# Patient Record
Sex: FEMALE | Race: WHITE
[De-identification: names, ages, dates, MRNs, and addresses within clinical notes are randomized per-mention and may not be internally consistent; named-entity substitution may affect disease eponyms.]

---

## 2021-05-12 DIAGNOSIS — Z87.39 PERSONAL HISTORY OF OTHER DISEASES OF THE MUSCULOSKELETAL SYSTEM AND CONNECTIVE TISSUE: ICD-10-CM

## 2021-05-12 DIAGNOSIS — Z78.9 OTHER SPECIFIED HEALTH STATUS: ICD-10-CM

## 2021-05-12 DIAGNOSIS — Z85.831 PERSONAL HISTORY OF MALIGNANT NEOPLASM OF SOFT TISSUE: ICD-10-CM

## 2021-05-12 PROBLEM — Z00.00 ENCOUNTER FOR PREVENTIVE HEALTH EXAMINATION: Status: ACTIVE | Noted: 2021-05-12

## 2021-05-12 RX ORDER — NAPROXEN 375 MG/1
375 TABLET ORAL
Refills: 0 | Status: ACTIVE | COMMUNITY

## 2021-05-17 ENCOUNTER — APPOINTMENT (OUTPATIENT)
Dept: OBGYN | Facility: CLINIC | Age: 71
End: 2021-05-17
Payer: MEDICARE

## 2021-05-17 VITALS
WEIGHT: 143 LBS | SYSTOLIC BLOOD PRESSURE: 122 MMHG | TEMPERATURE: 97.3 F | HEIGHT: 58 IN | BODY MASS INDEX: 30.02 KG/M2 | DIASTOLIC BLOOD PRESSURE: 80 MMHG

## 2021-05-17 PROCEDURE — 99213 OFFICE O/P EST LOW 20 MIN: CPT | Mod: 25

## 2021-05-17 PROCEDURE — 99072 ADDL SUPL MATRL&STAF TM PHE: CPT

## 2021-05-17 PROCEDURE — 76830 TRANSVAGINAL US NON-OB: CPT

## 2021-05-17 NOTE — PROCEDURE
[Transvaginal Ultrasound] : transvaginal ultrasound [FreeTextEntry3] : no changes\par all ok\par observe [FreeTextEntry5] : 24.5cc, lining; 2mm [FreeTextEntry7] : 0.3cc [FreeTextEntry8] : 0.5cc

## 2021-05-17 NOTE — HISTORY OF PRESENT ILLNESS
[FreeTextEntry1] : last carlin note:\par \par 2. Noninflammatory disorder of uterus -\par PELVIC MRI PRE AND POST CONTRAST WERE NEGATIVE\par \par calcified myoma\par \par SONO TODAY, CONFIRMATORY\par \par \par N85.9: Noninflammatory disorder of uterus, unspecified\par US, TRANSVAGINAL\par \par US, TRANSVAGINAL\par \par Review of us, transvaginal taken on 07/20/2020 at [facility] OFFICE shows:\par Imaging Studies:\par Uterus: volume (cc): 25.2 and fibroids (SM: 0.6CC).\par Endometrium: thickness 2.1 mm.\par Cervix: normal.\par Cul de sac: no fluid was demonstrated.\par Right Ovary: volume (cc): 0.4.\par Left Ovary: volume (cc): 0.3.\par \par Today, here for fu

## 2021-05-23 LAB
C TRACH RRNA SPEC QL NAA+PROBE: NOT DETECTED
HPV HIGH+LOW RISK DNA PNL CVX: NOT DETECTED
N GONORRHOEA RRNA SPEC QL NAA+PROBE: NOT DETECTED
SOURCE AMPLIFICATION: NORMAL

## 2021-05-30 LAB — CYTOLOGY CVX/VAG DOC THIN PREP: ABNORMAL

## 2022-06-02 ENCOUNTER — NON-APPOINTMENT (OUTPATIENT)
Age: 72
End: 2022-06-02

## 2022-06-06 ENCOUNTER — APPOINTMENT (OUTPATIENT)
Dept: OBGYN | Facility: CLINIC | Age: 72
End: 2022-06-06
Payer: MEDICARE

## 2022-06-06 VITALS
DIASTOLIC BLOOD PRESSURE: 91 MMHG | BODY MASS INDEX: 32.32 KG/M2 | SYSTOLIC BLOOD PRESSURE: 134 MMHG | TEMPERATURE: 97.8 F | WEIGHT: 154 LBS | HEIGHT: 58 IN

## 2022-06-06 DIAGNOSIS — Z01.419 ENCOUNTER FOR GYNECOLOGICAL EXAMINATION (GENERAL) (ROUTINE) W/OUT ABNORMAL FINDINGS: ICD-10-CM

## 2022-06-06 PROCEDURE — 76830 TRANSVAGINAL US NON-OB: CPT

## 2022-06-06 PROCEDURE — G0439: CPT

## 2022-06-06 NOTE — PROCEDURE
[Transvaginal Ultrasound] : transvaginal ultrasound [FreeTextEntry3] : cervix normal\par no free fluid\par type 3 myoma unchanged\par lining thin [FreeTextEntry5] : 31cc vol,  endometrium 2mm,  fibroid; 0.9cc volume, type 3 [FreeTextEntry7] : 0.6cc [FreeTextEntry8] : 0.3cc

## 2022-06-06 NOTE — HISTORY OF PRESENT ILLNESS
[FreeTextEntry1] : PT WITH HX OF NONINFLAMMATORY DISORDER OF THE UTERUS\par HERE FOR EXAM\par no complaints\par feels well\par no bleeding or pain\par \par last carlin visit:\par    	Print\par Patient\par Name	TRISTIN HENRIQUEZ (70yo, F) ID# [id number]	Appt. Date/Time	2020 02:40PM\par 	1950	Service Dept.	[facility] OFFICE\par Provider	AMADA DIAZ MD\par Insurance	\par Med Primary: [place] ([place]ICARE)\par Insurance # : [id number]\par Med Secondary: [employer] (MEDICARE SUPPLEMENT)\par Insurance # : [id number]\par Prescription: [facility] - Member is eligible. details\par Prescription: [facility] - Member is ineligible. Patient found on payor's files, but not covered on date of inquiry. details\par Chief Complaint\par gyn exam\par Patient's Care Team\par Primary Care Provider: MILLIE OAKLEY MD: [place] [address], Ph [phone], Fax [phone] NPI: [id number]\par Patient's Pharmacies\par [id number] [id number] [address], Ph [phone], Fax [phone]\par Vitals\par Ht:	4 ft 11 in 2020 02:53 pm\par Wt:	146 lbs 2020 02:53 pm\par BMI:	29.5 2020 02:53 pm\par BP:	124/78 sitting 2020 02:53 pm\par T:	98.1 F° 2020 02:58 pm\par Allergies\par Reviewed Allergies\par NKDA\par Medications\par Reviewed Medications\par naproxen 375 mg tablet\par 20   filled	surescripts\par Problems\par Reviewed Problems\par Family History\par Reviewed Family History\par Social History\par Reviewed Social History\par Tobacco Smoking Status: Never smoker\par Most Recent Tobacco Use Screenin2020\par Surgical History\par Salpingo-oophorectomy - RIGHT\par Excision of lipoma - PATH LIPOSARCOMA\par GYN History\par Reviewed GYN History\par LMP: Approximate.\par Date of LMP: (Notes: ).\par Obstetric History\par Reviewed Obstetric History\par TOTAL	FULL	PRE	AB. I	AB. S	ECTOPICS	MULTIPLE	LIVING\par 3	2				1		2\par Past Medical History\par Cancer: Y - LIPOSARCOMA LEFT HIP\par Notes: OSTEOPENIA\par Screening\par None recorded.\par HPI\par HX OF FIBROIDS, THOUGHT TO BE ENDO THICKENING\par \par BUT MRI AND SONO PROVED AGAINST IT\par \par ROS\par Patient reports no fatigue, no fever, no significant weight gain, and no significant weight loss. She reports no abnormal moles and no rashes. She reports no irritation and no vision changes. She reports no hearing loss, no ear pain, no nose/sinus problems, no sore throat, no snoring, no dry mouth, and no mouth ulcers. She reports no dyspnea / shortness of breath, no cough, no sputum production, no hemoptysis, and no wheezing. She reports no chest pain, no palpitations, and no orthopnea. She reports no heartburn, no dysphagia, no nausea, no vomiting, no abdominal pain, no bowel movement changes, no diarrhea, no constipation, and no rectal bleeding. She reports no hematuria, no abnormal bleeding, no flank pain, no trouble urinating, no incontinence, no rash, no lesion, no discharge, no vaginal odor, and no vaginal itching. She reports no menstrual problems and no PMDD symptoms. She reports no menopausal symptoms. She reports no sexual problems. She reports no muscle aches, no muscle weakness, no arthralgias/joint pain, and no back pain. She reports no headaches, no dizziness, no LOC, no weakness, no numbness, and no seizures. She reports no depression, no alcoholism, and no sleep disturbances.\par Physical Exam\par Patient is a 69-year-old female.\par \par Female Genitalia: Vulva: no masses, atrophy, or lesions. Bladder/Urethra: no urethral discharge or mass and normal meatus and bladder non distended. Vagina no tenderness, erythema, cystocele, rectocele, abnormal vaginal discharge, or vesicle(s) or ulcers. Cervix: no discharge or cervical motion tenderness and grossly normal. Uterus: normal size and shape and midline, mobile, non-tender, and no uterine prolapse. Adnexa/Parametria: no parametrial tenderness or mass and no adnexal tenderness or ovarian mass.\par \par Breast: Inspection/Palpation: no erythema, induration, tenderness, skin changes, abnormal secretions, or distinct masses and normal nipple appearance and non tender axillary lymph nodes.\par \par Abdomen: Auscultation/Inspection/Palpation: no tenderness, hepatomegaly, splenomegaly, masses, or CVA tenderness and soft, non-distended, and normal bowel sounds. Hernia: none palpated.\par Assessment / Plan\par 1. Gynecologic examination\par Z01.419: Encounter for gynecological examination (general) (routine) without abnormal findings\par \par 2. Noninflammatory disorder of uterus -\par PELVIC MRI PRE AND POST CONTRAST WERE NEGATIVE\par \par calcified myoma\par \par SONO TODAY, CONFIRMATORY\par \par \par N85.9: Noninflammatory disorder of uterus, unspecified\par US, TRANSVAGINAL\par \par US, TRANSVAGINAL\par \par Review of us, transvaginal taken on 2020 at [facility] OFFICE shows:\par Imaging Studies:\par Uterus: volume (cc): 25.2 and fibroids (SM: 0.6CC).\par Endometrium: thickness 2.1 mm.\par Cervix: normal.\par Cul de sac: no fluid was demonstrated.\par Right Ovary: volume (cc): 0.4.\par Left Ovary: volume (cc): 0.3.\par \par Return to Office\par None recorded.\par Encounter Sign-Off\par Encounter signed-off by Amada Diaz MD, 2020.\par Encounter performed and documented by Amada Diaz MD\par Encounter reviewed & signed by Amada Diaz MD on 2020 at 3:14pm

## 2022-06-06 NOTE — PHYSICAL EXAM
[Examination Of The Breasts] : a normal appearance [No Masses] : no breast masses were palpable [Labia Majora] : normal [Labia Minora] : normal [Normal] : normal [Uterine Adnexae] : normal No

## 2022-06-12 LAB — HPV HIGH+LOW RISK DNA PNL CVX: NOT DETECTED

## 2022-06-13 LAB — CYTOLOGY CVX/VAG DOC THIN PREP: ABNORMAL

## 2023-08-28 ENCOUNTER — APPOINTMENT (OUTPATIENT)
Dept: OBGYN | Facility: CLINIC | Age: 73
End: 2023-08-28
Payer: MEDICARE

## 2023-08-28 VITALS
HEIGHT: 60 IN | DIASTOLIC BLOOD PRESSURE: 89 MMHG | HEART RATE: 67 BPM | WEIGHT: 150 LBS | SYSTOLIC BLOOD PRESSURE: 138 MMHG | BODY MASS INDEX: 29.45 KG/M2

## 2023-08-28 DIAGNOSIS — N85.9 NONINFLAMMATORY DISORDER OF UTERUS, UNSPECIFIED: ICD-10-CM

## 2023-08-28 DIAGNOSIS — R10.2 PELVIC AND PERINEAL PAIN: ICD-10-CM

## 2023-08-28 LAB
BILIRUB UR QL STRIP: NEGATIVE
CLARITY UR: CLEAR
COLLECTION METHOD: NORMAL
GLUCOSE UR-MCNC: NEGATIVE
HCG UR QL: 0.2 EU/DL
HGB UR QL STRIP.AUTO: NORMAL
KETONES UR-MCNC: NEGATIVE
LEUKOCYTE ESTERASE UR QL STRIP: NEGATIVE
NITRITE UR QL STRIP: NEGATIVE
PH UR STRIP: 6
PROT UR STRIP-MCNC: NEGATIVE
SP GR UR STRIP: 1.01

## 2023-08-28 PROCEDURE — 99213 OFFICE O/P EST LOW 20 MIN: CPT | Mod: 25

## 2023-08-28 PROCEDURE — 81003 URINALYSIS AUTO W/O SCOPE: CPT | Mod: QW

## 2023-08-28 RX ORDER — ATORVASTATIN CALCIUM 10 MG/1
10 TABLET, FILM COATED ORAL
Refills: 0 | Status: ACTIVE | COMMUNITY

## 2023-08-28 NOTE — HISTORY OF PRESENT ILLNESS
[FreeTextEntry1] : Here for her followup Needs evaluation of her endometrial lining/type 3 myoma Has left sided vulvar pain...no edema or erythema...pain comes and goes, sharp/knife -like, but only lasts a few seconds [Localized] : localized [TextBox_7] : left vulva [TextBox_10] : khanh [TextBox_13] : intermittent